# Patient Record
Sex: MALE | Race: WHITE | Employment: FULL TIME | URBAN - METROPOLITAN AREA
[De-identification: names, ages, dates, MRNs, and addresses within clinical notes are randomized per-mention and may not be internally consistent; named-entity substitution may affect disease eponyms.]

---
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
## 2024-05-14 ENCOUNTER — OFFICE VISIT (OUTPATIENT)
Dept: PRIMARY CARE CLINIC | Age: 19
End: 2024-05-14
Payer: COMMERCIAL

## 2024-05-14 VITALS
HEART RATE: 74 BPM | WEIGHT: 171 LBS | HEIGHT: 72 IN | DIASTOLIC BLOOD PRESSURE: 76 MMHG | SYSTOLIC BLOOD PRESSURE: 118 MMHG | BODY MASS INDEX: 23.16 KG/M2 | OXYGEN SATURATION: 98 %

## 2024-05-14 DIAGNOSIS — H69.93 EUSTACHIAN TUBE DYSFUNCTION, BILATERAL: Primary | ICD-10-CM

## 2024-05-14 PROCEDURE — 99213 OFFICE O/P EST LOW 20 MIN: CPT | Performed by: STUDENT IN AN ORGANIZED HEALTH CARE EDUCATION/TRAINING PROGRAM

## 2024-05-14 RX ORDER — FLUTICASONE PROPIONATE 50 MCG
2 SPRAY, SUSPENSION (ML) NASAL DAILY
Qty: 16 G | Refills: 0 | Status: SHIPPED | OUTPATIENT
Start: 2024-05-14

## 2024-05-14 ASSESSMENT — ENCOUNTER SYMPTOMS
SHORTNESS OF BREATH: 0
SORE THROAT: 0
COUGH: 0

## 2024-05-14 NOTE — PROGRESS NOTES
[facility]  [facility]  [address]  [facility] [address]  Dept: [phone]    Cruz Perez is a 18 y.o. male established patient, who presents acutely for his complaints as noted below:    Chief Complaint   Patient presents with    Otalgia     Left ear pain x 4 days        HPI:     Left ear pain: 4 days in duration. Also has some left jaw pain.    Reviewed prior notes from PCP.  Reviewed previous labs.    No components found for: \"LDLCHOLESTEROL\", \"LDLCALC\"    (goal LDL is <100)   AST (U/L)   Date Value   04/15/2022 21     ALT (U/L)   Date Value   04/15/2022 31     BUN (mg/dL)   Date Value   04/15/2022 15     BP Readings from Last 3 Encounters:   05/14/24 118/76   06/09/23 116/82   10/12/22 117/66          (goal 120/80)    Past Medical History:   Diagnosis Date    Allergic     itchy eyes      Past Surgical History:   Procedure Laterality Date    TONSILLECTOMY      UPPER GASTROINTESTINAL ENDOSCOPY N/A 10/12/2022    EGD BIOPSY performed by Marti Simmons MD at [facility] OR       Family History   Problem Relation Age of Onset    Cancer Maternal Grandmother         lymphoma       Social History     Tobacco Use    Smoking status: Never    Smokeless tobacco: Never   Substance Use Topics    Alcohol use: No     Alcohol/week: 0.0 standard drinks of alcohol      Current Outpatient Medications   Medication Sig Dispense Refill    fluticasone (FLONASE) 50 MCG/ACT nasal spray 2 sprays by Each Nostril route daily 16 g 0    famotidine (PEPCID) 40 MG tablet Take 1 tablet by mouth every evening 30 tablet 5     No current facility-administered medications for this visit.     Allergies   Allergen Reactions    Amoxicillin     Eryped [Erythromycin] Nausea Only       Health Maintenance   Topic Date Due    COVID-19 Vaccine (1) Never done    HPV vaccine (1 - Male 2-dose series) Never done    HIV screen  Never done    Hepatitis C screen  Never done    Depression Screen  06/09/2024    Flu vaccine

## 2024-06-07 ENCOUNTER — TELEPHONE (OUTPATIENT)
Dept: PRIMARY CARE CLINIC | Age: 19
End: 2024-06-07

## 2024-06-07 NOTE — TELEPHONE ENCOUNTER
The patients mother called to schedule an appt for her son, he has one scheduled with Dr Rodriguez on the 18th regarding his lt knee, its sore in the front, inside, and back. She is asking if you could send an order for xray or referral prior to appt or if they even need the appt and see an orthopedic surgeon.     Please advise.

## 2024-06-12 ENCOUNTER — OFFICE VISIT (OUTPATIENT)
Dept: PRIMARY CARE CLINIC | Age: 19
End: 2024-06-12
Payer: COMMERCIAL

## 2024-06-12 VITALS
WEIGHT: 169 LBS | BODY MASS INDEX: 22.89 KG/M2 | OXYGEN SATURATION: 98 % | HEART RATE: 85 BPM | SYSTOLIC BLOOD PRESSURE: 128 MMHG | HEIGHT: 72 IN | DIASTOLIC BLOOD PRESSURE: 76 MMHG

## 2024-06-12 DIAGNOSIS — M25.562 ACUTE PAIN OF LEFT KNEE: Primary | ICD-10-CM

## 2024-06-12 PROCEDURE — 99213 OFFICE O/P EST LOW 20 MIN: CPT | Performed by: STUDENT IN AN ORGANIZED HEALTH CARE EDUCATION/TRAINING PROGRAM

## 2024-06-12 ASSESSMENT — ENCOUNTER SYMPTOMS: SHORTNESS OF BREATH: 0

## 2024-06-12 NOTE — PROGRESS NOTES
[facility]  [facility]  [id number]  [place] [address]  Dept: [phone]    Cruz Perez is a 18 y.o. male established patient, who presents acutely for his complaints as noted below:    Chief Complaint   Patient presents with    Knee Pain     Left knee pain, pain is in the back of the knee. Pain started 1 month ago on and off. No known injury        HPI:     Left knee pain, back of knee. Started 1 month ago. No precipitating injury. Reports a pinching type of pain, occasionally has pain on the medial and anterior aspect as well. Full extension, at lift-off portion of gait, standing after squatting cause pain.    Reviewed prior notes from PCP.  Reviewed previous labs.    No components found for: \"LDLCHOLESTEROL\", \"LDLCALC\"    (goal LDL is <100)   AST (U/L)   Date Value   04/15/2022 21     ALT (U/L)   Date Value   04/15/2022 31     BUN (mg/dL)   Date Value   04/15/2022 15     BP Readings from Last 3 Encounters:   06/12/24 128/76   05/14/24 118/76   06/09/23 116/82          (goal 120/80)    Past Medical History:   Diagnosis Date    Allergic     itchy eyes      Past Surgical History:   Procedure Laterality Date    TONSILLECTOMY      UPPER GASTROINTESTINAL ENDOSCOPY N/A 10/12/2022    EGD BIOPSY performed by Marti Simmons MD at [facility]       Family History   Problem Relation Age of Onset    Cancer Maternal Grandmother         lymphoma       Social History     Tobacco Use    Smoking status: Never    Smokeless tobacco: Never   Substance Use Topics    Alcohol use: No     Alcohol/week: 0.0 standard drinks of alcohol      Current Outpatient Medications   Medication Sig Dispense Refill    famotidine (PEPCID) 40 MG tablet Take 1 tablet by mouth every evening 30 tablet 5     No current facility-administered medications for this visit.     Allergies   Allergen Reactions    Amoxicillin     Eryped [Erythromycin] Nausea Only       Health Maintenance   Topic Date Due    COVID-19

## (undated) DEVICE — BLOCK BITE 60FR RUBBER ADLT DENTAL

## (undated) DEVICE — CO2 CANNULA,SUPERSOFT, ADLT,7'O2,7'CO2: Brand: MEDLINE

## (undated) DEVICE — GLOVE SURG 8 11.7IN BEAD CUF LIGHT BRN SENSICARE LTX FREE

## (undated) DEVICE — FORCEPS BX L240CM WRK CHN 2.8MM STD CAP W/ NDL MIC MESH

## (undated) DEVICE — BASIN EMSIS 700ML GRAPHITE PLAS KID SHP GRAD

## (undated) DEVICE — Device: Brand: DEFENDO VALVE AND CONNECTOR KIT

## (undated) DEVICE — MEDICINE CUP, GRADUATED, STER: Brand: MEDLINE

## (undated) DEVICE — JELLY,LUBE,STERILE,FLIP TOP,TUBE,2-OZ: Brand: MEDLINE

## (undated) DEVICE — GAUZE,SPONGE,4"X4",16PLY,STRL,LF,10/TRAY: Brand: MEDLINE